# Patient Record
Sex: MALE | Race: WHITE
[De-identification: names, ages, dates, MRNs, and addresses within clinical notes are randomized per-mention and may not be internally consistent; named-entity substitution may affect disease eponyms.]

---

## 2019-07-19 ENCOUNTER — HOSPITAL ENCOUNTER (EMERGENCY)
Dept: HOSPITAL 91 - E/R | Age: 59
Discharge: HOME | End: 2019-07-19
Payer: SELF-PAY

## 2019-07-19 ENCOUNTER — HOSPITAL ENCOUNTER (EMERGENCY)
Dept: HOSPITAL 10 - E/R | Age: 59
Discharge: HOME | End: 2019-07-19
Payer: SELF-PAY

## 2019-07-19 VITALS
BODY MASS INDEX: 32.91 KG/M2 | HEIGHT: 67 IN | WEIGHT: 209.66 LBS | BODY MASS INDEX: 32.91 KG/M2 | WEIGHT: 209.66 LBS | HEIGHT: 67 IN

## 2019-07-19 VITALS — RESPIRATION RATE: 17 BRPM | SYSTOLIC BLOOD PRESSURE: 151 MMHG | DIASTOLIC BLOOD PRESSURE: 105 MMHG | HEART RATE: 75 BPM

## 2019-07-19 DIAGNOSIS — R51: ICD-10-CM

## 2019-07-19 DIAGNOSIS — R50.9: Primary | ICD-10-CM

## 2019-07-19 LAB
ABNORMAL IP MESSAGE: 1
ADD MAN DIFF?: NO
ADD UMIC: YES
ALANINE AMINOTRANSFERASE: 43 IU/L (ref 13–69)
ALBUMIN/GLOBULIN RATIO: 1.25
ALBUMIN: 4.4 G/DL (ref 3.3–4.9)
ALKALINE PHOSPHATASE: 82 IU/L (ref 42–121)
ANION GAP: 10 (ref 5–13)
ASPARTATE AMINO TRANSFERASE: 67 IU/L (ref 15–46)
BAND NEUTROPHILS #M: 0.7 10^3/UL (ref 0–0.6)
BAND NEUTROPHILS % (M): 21 % (ref 0–4)
BASOPHIL #: 0 10^3/UL (ref 0–0.1)
BASOPHIL #M: 0 10^3/UL (ref 0–0)
BASOPHILS % (M): 2 % (ref 0–2)
BASOPHILS %: 1 % (ref 0–2)
BILIRUBIN,DIRECT: 0 MG/DL (ref 0–0.2)
BILIRUBIN,TOTAL: 0.3 MG/DL (ref 0.2–1.3)
BLOOD UREA NITROGEN: 13 MG/DL (ref 7–20)
CALCIUM: 9 MG/DL (ref 8.4–10.2)
CARBON DIOXIDE: 25 MMOL/L (ref 21–31)
CHLORIDE: 101 MMOL/L (ref 97–110)
CREATININE: 1.1 MG/DL (ref 0.61–1.24)
EOSINOPHILS #: 0 10^3/UL (ref 0–0.5)
EOSINOPHILS % (M): 2 % (ref 0–7)
EOSINOPHILS %: 0.5 % (ref 0–7)
GIANT THROMBO% (M): 5 % (ref 0–0)
GLOBULIN: 3.5 G/DL (ref 1.3–3.2)
GLUCOSE: 125 MG/DL (ref 70–220)
HEMATOCRIT: 46.2 % (ref 42–52)
HEMOGLOBIN: 15.3 G/DL (ref 14–18)
IMMATURE GRANS #M: 0.02 10^3/UL (ref 0–0.03)
IMMATURE GRANS % (M): 0.5 % (ref 0–0.43)
INR: 0.97
LACTIC ACID: 1.1 MMOL/L (ref 0.5–2)
LYMPHOCYTES #: 0.6 10^3/UL (ref 0.8–2.9)
LYMPHOCYTES #M: 0.5 10^3/UL (ref 0.8–2.9)
LYMPHOCYTES % (M): 14 % (ref 15–51)
LYMPHOCYTES %: 15.5 % (ref 15–51)
MEAN CORPUSCULAR HEMOGLOBIN: 28.4 PG (ref 29–33)
MEAN CORPUSCULAR HGB CONC: 33.1 G/DL (ref 32–37)
MEAN CORPUSCULAR VOLUME: 85.9 FL (ref 82–101)
MEAN PLATELET VOLUME: 12.5 FL (ref 7.4–10.4)
MONOCYTE #: 0.3 10^3/UL (ref 0.3–0.9)
MONOCYTE #M: 0.2 10^3/UL (ref 0.3–0.9)
MONOCYTES % (M): 6 % (ref 0–11)
MONOCYTES %: 8.7 % (ref 0–11)
NEUTROPHIL #: 2.8 10^3/UL (ref 1.6–7.5)
NEUTROPHILS %: 73.8 % (ref 39–77)
NUCLEATED RED BLOOD CELLS #: 0 10^3/UL (ref 0–0)
NUCLEATED RED BLOOD CELLS%: 0 /100WBC (ref 0–0)
PARTIAL THROMBOPLASTIN TIME: 37.7 SEC (ref 23–35)
PLATELET COUNT: 56 10^3/UL (ref 140–415)
PLATELET ESTIMATE: (no result)
POSITIVE DIFF: (no result)
POTASSIUM: 4.3 MMOL/L (ref 3.5–5.1)
PROTIME: 13 SEC (ref 11.9–14.9)
PT RATIO: 1
REACTIVE LYMPHOCYTES #M: 0 10^3/UL (ref 0–0)
REACTIVE LYMPHOCYTES% (M): 1 % (ref 0–0)
RED BLOOD COUNT: 5.38 10^6/UL (ref 4.7–6.1)
RED CELL DISTRIBUTION WIDTH: 14.1 % (ref 11.5–14.5)
SEG NEUT #M: 2.1 10^3/UL (ref 1.6–7.5)
SEGMENTED NEUTROPHILS (M) %: 54 % (ref 39–77)
SMUDGE%M: 48 % (ref 0–0)
SODIUM: 136 MMOL/L (ref 135–144)
TOTAL PROTEIN: 7.9 G/DL (ref 6.1–8.1)
TROPONIN-I: < 0.012 NG/ML (ref 0–0.12)
UR ASCORBIC ACID: 40 MG/DL
UR BILIRUBIN (DIP): NEGATIVE MG/DL
UR BLOOD (DIP): NEGATIVE MG/DL
UR CLARITY: (no result)
UR COLOR: YELLOW
UR GLUCOSE (DIP): NEGATIVE MG/DL
UR KETONES (DIP): (no result) MG/DL
UR LEUKOCYTE ESTERASE (DIP): NEGATIVE LEU/UL
UR NITRITE (DIP): NEGATIVE MG/DL
UR PH (DIP): 6 (ref 5–9)
UR RBC: 25 /HPF (ref 0–5)
UR SPECIFIC GRAVITY (DIP): 1.02 (ref 1–1.03)
UR TOTAL PROTEIN (DIP): (no result) MG/DL
UR UROBILINOGEN (DIP): (no result) MG/DL
UR WBC: 1 /HPF (ref 0–5)
WHITE BLOOD COUNT: 3.8 10^3/UL (ref 4.8–10.8)

## 2019-07-19 PROCEDURE — 93005 ELECTROCARDIOGRAM TRACING: CPT

## 2019-07-19 PROCEDURE — 70450 CT HEAD/BRAIN W/O DYE: CPT

## 2019-07-19 PROCEDURE — 85610 PROTHROMBIN TIME: CPT

## 2019-07-19 PROCEDURE — 83605 ASSAY OF LACTIC ACID: CPT

## 2019-07-19 PROCEDURE — 99285 EMERGENCY DEPT VISIT HI MDM: CPT

## 2019-07-19 PROCEDURE — 87086 URINE CULTURE/COLONY COUNT: CPT

## 2019-07-19 PROCEDURE — 85025 COMPLETE CBC W/AUTO DIFF WBC: CPT

## 2019-07-19 PROCEDURE — 36415 COLL VENOUS BLD VENIPUNCTURE: CPT

## 2019-07-19 PROCEDURE — 80053 COMPREHEN METABOLIC PANEL: CPT

## 2019-07-19 PROCEDURE — 84484 ASSAY OF TROPONIN QUANT: CPT

## 2019-07-19 PROCEDURE — 96375 TX/PRO/DX INJ NEW DRUG ADDON: CPT

## 2019-07-19 PROCEDURE — 81001 URINALYSIS AUTO W/SCOPE: CPT

## 2019-07-19 PROCEDURE — 71045 X-RAY EXAM CHEST 1 VIEW: CPT

## 2019-07-19 PROCEDURE — 85730 THROMBOPLASTIN TIME PARTIAL: CPT

## 2019-07-19 PROCEDURE — 96374 THER/PROPH/DIAG INJ IV PUSH: CPT

## 2019-07-19 PROCEDURE — 87040 BLOOD CULTURE FOR BACTERIA: CPT

## 2019-07-19 RX ADMIN — THIAMINE HYDROCHLORIDE 1 MLS/HR: 100 INJECTION, SOLUTION INTRAMUSCULAR; INTRAVENOUS at 21:52

## 2019-07-19 RX ADMIN — ACETAMINOPHEN 1 MG: 500 TABLET, FILM COATED ORAL at 19:50

## 2019-07-19 RX ADMIN — CEFEPIME HYDROCHLORIDE 1 MLS/HR: 2 INJECTION, POWDER, FOR SOLUTION INTRAVENOUS at 20:07

## 2019-07-19 RX ADMIN — IBUPROFEN 1 MG: 800 TABLET, FILM COATED ORAL at 19:51

## 2019-07-19 RX ADMIN — VANCOMYCIN HYDROCHLORIDE 1 MLS/HR: 1 INJECTION, POWDER, LYOPHILIZED, FOR SOLUTION INTRAVENOUS at 20:37

## 2019-07-19 RX ADMIN — THIAMINE HYDROCHLORIDE 1 ML: 100 INJECTION, SOLUTION INTRAMUSCULAR; INTRAVENOUS at 19:33

## 2019-07-19 RX ADMIN — KETOROLAC TROMETHAMINE 1 MG: 30 INJECTION, SOLUTION INTRAMUSCULAR at 21:53

## 2019-07-19 NOTE — ERD
ER Documentation


Chief Complaint


Chief Complaint





fever w/ HAs x 2-3 days; just came from [place]





HPI


This is a 58-year-old male with no past medical history that presents to the 


emergency department complaining of 3 days of a tactile fever shaking and 


chills.  The patient states he is been having a bandlike headache.  He states is


not the worst headache of his life.  He denies any neck pain.  The patient lives


in [place].  He arrived from [place] 1 week ago and is staying with family.  


He has been taking Tylenol.  This will improve his fevers and then spontaneously


resolves.  He states he has had no cough.  He has no shortness of breath.  He 


said no runny nose.  He does complain of frequent urination but denies dysuria u


rgency.  The patient stated he is concerned that he could have dengue fever 


given that there was an outbreak in [place].  He denies any rashes.  He denies 


any ocular pain or changes in vision.  He has no nausea or vomiting.  He denies 


any abdominal pain.  He does not recall being bit by a mosquito the patient 


stated he has had no epistaxis, no gingival bleeding.  No hemoptysis or 


hematemesis.  No hematuria.  No blood present within his stool.





ROS


All systems reviewed and are negative except as per history of present illness.





Allergies


Allergies:  


Coded Allergies:  


     No Known Allergy (Unverified , 7/19/19)





PMhx/Soc


History of Surgery:  Yes (HERNIA)


Hx Miscellaneous Medical Probl:  Yes (GASTRITIS)


Hx Alcohol Use:  Yes


Hx Substance Use:  No


Hx Tobacco Use:  No


Smoking Status:  Unknown if ever smoked





Physical Exam


Vitals





Vital Signs


  Date      Temp   Pulse  Resp  B/P (MAP)   Pulse Ox  O2         O2 Flow    FiO2


Time                                                  Delivery   Rate


   7/19/19            94    24     172/117        97  Nasal


     20:24                           (135)            Cannula


   7/19/19            94    22     172/117        97  Nasal            2.0


     20:22                           (135)            Cannula


   7/19/19  102.0


     19:51


   7/19/19  102.0


     19:50


   7/19/19                                            Nasal


     19:35                                            Cannula


   7/19/19  102.7    109    20     170/101        95


     19:15                           (124)





Physical Exam


Constitutional:Well-developed. Well-nourished.


HEENT:Normocephalic. Atraumatic.Pupils were equal round reactive to light. Dry 


mucous membranes.No tonsillar exudates.


Neck: No nuchal rigidity. No lymphadenopathy. No posterior cervical spine 


tenderness or step-offs.


Respiratory: Not using accessory muscles of respiration.Lungs were clear to 


auscultation bilaterally. No rhonchi. No rales. No wheezing. 


Cardiovascular: Regular rate regular rhythm.No murmurs. No rubs were 


appreciated.S1, S2 normal. Distal pulses are palpable 2+ bilaterally.


GI: Abdomen was soft. Nontender. Non Distended. No pulsatile abdominal masses or


bruits. No rebound. No guarding. Bowel sounds were present and normal. 


Muscle skeletal: Full range of motion of both the upper and lower extremities 


bilaterally.Normal muscle tone.No assymetrical calf tenderness or swelling. 


Skin: No petechia, no purpura. No lesions on the palms or the soles of the feet.


No maculopapular rash.


NEURO: Patient was alert, awake, orientated x3.No facial droop. Gait observed 


and normal with no ataxia.Speech had regular rate and rhythm. No focal 


neurological deficits.


Result Diagram:  


7/19/19 1924 7/19/19 1924





Results 24 hrs





Laboratory Tests


Test
              7/19/19
19:23   7/19/19
19:24    7/19/19
19:50  7/19/19
21:06


POC Venous           0.9 mmol/L


Lactate


White Blood Count                   3.8 10^3/ul


Red Blood Count                    5.38 10^6/ul


Hemoglobin                            15.3 g/dl


Hematocrit                               46.2 %


Mean Corpuscular                        85.9 fl


Volume


Mean Corpuscular                        28.4 pg


Hemoglobin


Mean Corpuscular   
                 33.1 g/dl 
  
                



Hemoglobin
Concen


t


Red Cell                                 14.1 %


Distribution


Width


Platelet Count                       56 10^3/UL


Mean Platelet                           12.5 fl


Volume


Immature                                0.500 %


Granulocytes %


Neutrophils %                            73.8 %


Segmented          
                      54 % 
  
                



Neutrophils


%
(Manual)


Band Neutrophils                           21 %


% (Manual)


Lymphocytes %                            15.5 %


Lymphocytes %                              14 %


(Manual)


Reactive           
                       1 % 
  
                



Lymphocytes


%
(Manual)


Monocytes %                               8.7 %


Monocytes %                                 6 %


(Manual)


Eosinophils %                             0.5 %


Eosinophils %                               2 %


(Manual)


Basophils %                               1.0 %


Basophils %                                 2 %


(Manual)


Nucleated Red                       0.0 /100WBC


Blood Cells %


Immature                          0.020 10^3/ul


Granulocytes #


Neutrophils #                       2.8 10^3/ul


Neutrophils #                       2.1 10^3/ul


(Manual)


Band Neutrophils                    0.7 10^3/ul


#


Lymphocytes                         0.5 10^3/ul


(Manual)


Lymphocytes #                       0.6 10^3/ul


Reactive                            0.0 10^3/ul


Lymphocytes #


Monocytes #                         0.3 10^3/ul


Monocytes #                         0.2 10^3/ul


(Manual)


Eosinophils #                       0.0 10^3/ul


Basophils #                         0.0 10^3/ul


Basophils #                         0.0 10^3/ul


(Manual)


Nucleated Red                       0.0 10^3/ul


Blood Cells #


Platelet Estimate                 DECREASED


Giant Platelets                             5 %


Prothrombin Time                       13.0 Sec


Prothrombin Time                            1.0


Ratio


INR International  
                      0.97 
  
                



Normalized
Ratio


Activated          
                  37.7 Sec 
  
                



Partial
Thrombopl


ast Time


Sodium Level                         136 mmol/L


Potassium Level                      4.3 mmol/L


Chloride Level                       101 mmol/L


Carbon Dioxide                        25 mmol/L


Level


Anion Gap                                    10


Blood Urea                             13 mg/dl


Nitrogen


Creatinine                           1.10 mg/dl


Est Glomerular     
              > 60 mL/min 
   
                



Filtrat


Rate
mL/min


Glucose Level                         125 mg/dl


Calcium Level                         9.0 mg/dl


Total Bilirubin                       0.3 mg/dl


Direct Bilirubin                     0.00 mg/dl


Indirect                              0.3 mg/dl


Bilirubin


Aspartate Amino    
                   67 IU/L 
  
                



Transf
(AST/SGOT)


Alanine            
                   43 IU/L 
  
                



Aminotransferase



(ALT/SGPT)


Alkaline                                82 IU/L


Phosphatase


Troponin I                        < 0.012 ng/ml


Total Protein                          7.9 g/dl


Albumin                                4.4 g/dl


Globulin                              3.50 g/dl


Albumin/Globulin                           1.25


Ratio


Urine Color                                       YELLOW


Urine Clarity
     
              
               SLIGHTLY
CLOUDY  



Urine pH                                                     6.0


Urine Specific                                             1.021


Gravity


Urine Ketones                                     TRACE mg/dL


Urine Nitrite                                     NEGATIVE mg/dL


Urine Bilirubin                                   NEGATIVE mg/dL


Urine                                                   1+ mg/dL


Urobilinogen


Urine Leukocyte    
              
               NEGATIVE
Marisabel/ul  



Esterase



Urine Microscopic                                        25 /HPF


RBC


Urine Microscopic                                         1 /HPF


WBC


Urine Hemoglobin                                  NEGATIVE mg/dL


Urine Glucose                                     NEGATIVE mg/dL


Urine Total                                             1+ mg/dl


Protein


Lactic Acid Level                                                    1.1 mmol/L





Current Medications


 Medications
   Dose
          Sig/[place]
       Start Time
   Status  Last


 (Trade)       Ordered        Route
 PRN     Stop Time              Admin
Dose


                              Reason                                Admin


 Sodium         2,850 ml       BOLUS OVER 2   7/19/19       DC           7/19/19


Chloride
                     HOURS STAT
    19:20
                       19:33



(NS)                          IV*
           7/19/19 19:21


                1,000 mg       ONCE  STAT
    7/19/19       DC           7/19/19


Acetaminophen                 PO
            19:40
                       19:50




  (Tylenol                                  7/19/19 19:45


Tab)


 Ibuprofen
     800 mg         ONCE  ONCE
    7/19/19       DC           7/19/19


(Motrin)                      PO
            20:00
                       19:51



                                             7/19/19 20:01


 Cefepime HCl   50 ml @ 
      ONCE  STAT
    7/19/19       DC           7/19/19


               100 mls/hr     IVPB
          20:02
                       20:07



                                             7/19/19 20:31


 Vancomycin     250 ml @ 
     ONCE  ONCE
    7/19/19 7/19/19


HCl            125 mls/hr     IVPB
          20:30
                       20:37



                                             7/19/19 22:29


 Sodium         1,000 ml @ 
   Q1H STAT
      7/19/19 7/19/19


Chloride       1,000 mls/hr   IV
            21:42
                       21:52



                                             7/19/19 22:41


 Ketorolac
     30 mg          ONCE  STAT
    7/19/19       DC           7/19/19


Tromethamine
                 IV
            21:42
                       21:53



 (Toradol)                                   7/19/19 21:43








Procedures/MDM


The patient presented to the emergency department with an acute single headache 


that presented within days of onset my differential diagnosis included but was 


not limited to meningitis, SAH, intracerebral hemorrhage, hypertensive 


encephalopathy, cranial artery dissection, cerebral venous sinus thrombosis, 


traumatic, acute sinusitis. The patient has no ocular symptoms to suggest 


temporal neuritis, acute narrow-angle glaucoma or pituitary apoplexy.  The pat


ient did not appear to have a toxic or metabolic etiology such as fever, 


hypoglycemia, high-altitude disease or carbon monoxide poisoning. 





This was not the patients worse headache of their life. The patient had a 


complete neurologic and fundoscopic exam performed by myself that was normal 


with  no focal neurological deficits or retinal hemorrhage.  The patient did 


have a CT scan of his head which showed no intracerebral hemorrhage mass-effect 


or midline shift.  The patient had no nuchal rigidity.  I discussed with the 


patient the possibility of doing a lumbar puncture however the patient stated he


did not want this to be performed.  My clinical suspicion was low for 


meningitis.





I also indicated to the patient that I did not feel his symptoms were result of 


dengue fever and that there was no distinctive laboratory testing that could be 


immediately obtained in order to specifically diagnose a dengue virus infection.


 The patient did have a mild headache and fever but denied any nausea vomiting, 


retro-orbital pain.  He had not experienced any hemorrhagic manifestations or 


arthralgias or rashes.  He is a resident in a mosquito borne area of dengue 


fever infection however he stated he did not have any mosquito bites.








The patient stated this headache was not severe or distinct from other headaches


and the history with the physical exam findings did not likely suggest SAH.  


Therefore, I did not feel it was clinically necessary to perform a lumbar 


puncture and CSF analysis. 


12 Lead EKG tracing ordered and reviewed by myself showed: 


Normal sinus rhythm of 93 bpm and no arrhythmia.


NE interval normal.


QRS duration normal.


No ST segment elevation


No ST segment depression. No changes consistent with acute ischemia. 








Observation Note:


Time:   4 hours


Family Hx:   No Hypertension


Evaluation:   Multiple exams showed improving symptoms and no evidence of 


warning signs of severe infection of dengue fever.  The patient did have 


leukopenia with thrombocytopenia which could be findings consistent with dengue 


fever however again the patient did not have any signs of active bleeding.  I 


had a lengthy discussion with the patient that I did feel he would benefit from 


being admitted for observation with further evaluation.  However the patient 


stated he would prefer outpatient management.  I did indicate this was 


appropriate given that he has a presumptive diagnosis of dengue in the absence 


of warning signs or coexisting conditions as stated above. 





Critical Care:


Time: 65 minutes


Treatments/Evaluations: Close monitoring and treatment of unstable vital signs, 


cardiorespiratory, and neurologic status, while maintaining tight balance of 


fluid, respiratory, and cardiac interventions.  Time does not include performing


any of the above billable procedures.





Chest radiograph showed no evidence of pneumonia or infiltrates.





Departure


Diagnosis:  


   Primary Impression:  


   Fever


   Fever type:  unspecified  Qualified Codes:  R50.9 - Fever, unspecified


   Additional Impression:  


   Suspected dengue fever


Condition:  CASSI Marin MD            Jul 19, 2019 21:32